# Patient Record
Sex: FEMALE | Race: WHITE
[De-identification: names, ages, dates, MRNs, and addresses within clinical notes are randomized per-mention and may not be internally consistent; named-entity substitution may affect disease eponyms.]

---

## 2019-12-07 ENCOUNTER — HOSPITAL ENCOUNTER (EMERGENCY)
Dept: HOSPITAL 43 - DL.ED | Age: 34
Discharge: HOME | End: 2019-12-07
Payer: COMMERCIAL

## 2019-12-07 DIAGNOSIS — M54.42: Primary | ICD-10-CM

## 2019-12-07 PROCEDURE — 99283 EMERGENCY DEPT VISIT LOW MDM: CPT

## 2019-12-07 PROCEDURE — 96372 THER/PROPH/DIAG INJ SC/IM: CPT

## 2019-12-07 NOTE — EDM.PDOC
ED HPI GENERAL MEDICAL PROBLEM





- General


Chief Complaint: Back Pain or Injury


Stated Complaint: LOWER BACK PAIN


Time Seen by Provider: 19 04:10


Source of Information: Reports: Patient


History Limitations: Reports: No Limitations





- History of Present Illness


INITIAL COMMENTS - FREE TEXT/NARRATIVE: 





ED ambulatory with c/o low back pain radiating down left leg x 2 weeks, worse 

past 2 days. Onset of pain with manipulation at chiropractor. Seen in clinic 

last on Thursday. Given tramadol and flexeril with medrol dose pack, PT to 

start on Monday. Unable to find position of comfort. Increased pain shooting 

down leg with some movements. Alternating heat and ice. No weakness, no saddle 

anesthesia or incontinence. 


  ** Lower Back


Pain Score (Numeric/FACES): 8





- Related Data


 Allergies











Allergy/AdvReac Type Severity Reaction Status Date / Time


 


No Known Allergies Allergy   Verified 19 04:30











Home Meds: 


 Home Meds





. [No Known Home Meds]  18 [History]











Past Medical History





- Past Surgical History


GI Surgical History: Reports: Cholecystectomy


Female  Surgical History: Reports:  Section


Other Musculoskeletal Surgeries/Procedures:: bilateral knee surgery





Social & Family History





- Family History


Family Medical History: Noncontributory





- Tobacco Use


Smoking Status *Q: Never Smoker


Second Hand Smoke Exposure: No





- Caffeine Use


Caffeine Use: Reports: Soda





- Recreational Drug Use


Recreational Drug Use: No





ED ROS GENERAL





- Review of Systems


Review Of Systems: Comprehensive ROS is negative, except as noted in HPI.





ED EXAM,LOWER BACK PAIN/INJURY





- Physical Exam


Exam: See Below


Exam Limited By: No Limitations


General Appearance: Alert, Moderate Distress


Eye Exam: Bilateral Eye: EOMI


Ears: Normal External Exam


Throat/Mouth: Normal Inspection


Head: Atraumatic, Normocephalic


Neck: Normal Inspection, Full Range of Motion


Respiratory/Chest: Normal Breath Sounds


Cardiovascular: Normal Peripheral Pulses


GI/Abdominal: Soft


Back Exam: Decreased Range of Motion, Muscle Spasm, Paraspinal Tenderness (left)


Extremities: Normal Inspection


Neurological: Alert, Normal Mood/Affect, Oriented x 3, Straight Leg Raise (L).  

No: Saddle Anesthesia


Psychiatric: Normal Affect, Normal Mood


Skin Exam: Warm, Dry, Normal Color





Course





- Vital Signs


Last Recorded V/S: 


 Last Vital Signs











Temp  96.5 F   19 05:03


 


Pulse  90   19 05:03


 


Resp  19   19 05:03


 


BP  128/96 H  19 05:03


 


Pulse Ox  100   19 05:03














- Orders/Labs/Meds


Meds: 


Medications














Discontinued Medications














Generic Name Dose Route Start Last Admin





  Trade Name Dea  PRN Reason Stop Dose Admin


 


Hydrocodone Bitart/Acetaminophen  1 tab  19 04:28  19 04:38





  [place] 325-10 Mg  PO  19 04:29  1 tab





  ONETIME ONE   Administration





     





     





     





     


 


Ketorolac Tromethamine  30 mg  19 04:28  19 04:39





  Toradol  IM  19 04:29  30 mg





  ONETIME ONE   Administration





     





     





     





     


 


Orphenadrine Citrate  60 mg  19 04:28  19 04:40





  Norflex  IM  19 04:29  60 mg





  ONETIME ONE   Administration





     





     





     





     














- Re-Assessments/Exams


Free Text/Narrative Re-Assessment/Exam: 





Reports some improvement in pain.





Departure





- Departure


Time of Disposition: 04:44


Disposition: Home, Self-Care 01


Condition: Good


Clinical Impression: 


Low back pain


Qualifiers:


 Chronicity: acute Back pain laterality: left Sciatica presence: with sciatica 

Sciatica laterality: sciatica of left side Qualified Code(s): M54.42 - Lumbago 

with sciatica, left side








- Discharge Information


*PRESCRIPTION DRUG MONITORING PROGRAM REVIEWED*: Yes


*COPY OF PRESCRIPTION DRUG MONITORING REPORT IN PATIENT MUSA: Yes


Instructions:  Sciatica


Referrals: 


PCP,Unobtain [Primary Care Provider] - 


Forms:  ED Department Discharge


Additional Instructions: 


Flexeril 10mg every 8 hours as needed for spasm


hydrocodone 10/325 one every 6 hours as needed for severe pain


tramadol 50mg one every 8 hours as needed for moderate pain


bio freez to area as needed


continue alternating heat and ice


medrol dose pack per previous order from PCP


Clinic follow up Monday am. 


Urgent follow up if symptoms worsen, incontinent of bowel or bladder

## 2022-10-03 ENCOUNTER — HOSPITAL ENCOUNTER (EMERGENCY)
Dept: HOSPITAL 43 - DL.ED | Age: 37
Discharge: LEFT BEFORE DISCHARGE | End: 2022-10-03
Payer: COMMERCIAL

## 2022-10-03 DIAGNOSIS — Z53.21: Primary | ICD-10-CM

## 2023-03-07 ENCOUNTER — HOSPITAL ENCOUNTER (EMERGENCY)
Dept: HOSPITAL 43 - DL.ED | Age: 38
Discharge: HOME | End: 2023-03-07
Payer: COMMERCIAL

## 2023-03-07 DIAGNOSIS — Z79.899: ICD-10-CM

## 2023-03-07 DIAGNOSIS — D25.2: ICD-10-CM

## 2023-03-07 DIAGNOSIS — K59.00: Primary | ICD-10-CM

## 2023-03-07 DIAGNOSIS — E03.9: ICD-10-CM

## 2023-03-07 LAB
ANION GAP SERPL CALC-SCNC: 12.9 MEQ/L (ref 7–13)
CHLORIDE SERPL-SCNC: 101 MMOL/L (ref 98–107)
EGFRCR SERPLBLD CKD-EPI 2021: 94 ML/MIN (ref 60–?)
SODIUM SERPL-SCNC: 138 MMOL/L (ref 136–145)

## 2023-04-03 ENCOUNTER — HOSPITAL ENCOUNTER (EMERGENCY)
Dept: HOSPITAL 43 - DL.ED | Age: 38
Discharge: HOME | End: 2023-04-03
Payer: COMMERCIAL

## 2023-04-03 DIAGNOSIS — Z79.899: ICD-10-CM

## 2023-04-03 DIAGNOSIS — Z86.16: ICD-10-CM

## 2023-04-03 DIAGNOSIS — D25.2: Primary | ICD-10-CM

## 2023-04-03 DIAGNOSIS — E03.9: ICD-10-CM

## 2023-04-03 DIAGNOSIS — Z90.49: ICD-10-CM

## 2023-04-03 LAB
AMPHET UR QL SCN: NEGATIVE
AMPHET UR QL SCN: POSITIVE
AMPHETAMINES UR QL SCN>500 NG/ML: POSITIVE
ANION GAP SERPL CALC-SCNC: 10.2 MEQ/L (ref 7–13)
BARBITURATES UR QL SCN: POSITIVE
CHLORIDE SERPL-SCNC: 105 MMOL/L (ref 98–107)
EGFRCR SERPLBLD CKD-EPI 2021: 92 ML/MIN (ref 60–?)
MDMA UR QL SCN: NEGATIVE
OXYCODONE UR QL SCN: NEGATIVE
PCP UR QL SCN: NEGATIVE
SODIUM SERPL-SCNC: 141 MMOL/L (ref 136–145)
TRICYCLICS UR QL SCN: NEGATIVE

## 2023-04-03 PROCEDURE — 96376 TX/PRO/DX INJ SAME DRUG ADON: CPT

## 2023-04-03 PROCEDURE — 85025 COMPLETE CBC W/AUTO DIFF WBC: CPT

## 2023-04-03 PROCEDURE — 36415 COLL VENOUS BLD VENIPUNCTURE: CPT

## 2023-04-03 PROCEDURE — 81001 URINALYSIS AUTO W/SCOPE: CPT

## 2023-04-03 PROCEDURE — 99284 EMERGENCY DEPT VISIT MOD MDM: CPT

## 2023-04-03 PROCEDURE — 96372 THER/PROPH/DIAG INJ SC/IM: CPT

## 2023-04-03 PROCEDURE — 74177 CT ABD & PELVIS W/CONTRAST: CPT

## 2023-04-03 PROCEDURE — 83605 ASSAY OF LACTIC ACID: CPT

## 2023-04-03 PROCEDURE — 80053 COMPREHEN METABOLIC PANEL: CPT

## 2023-04-03 PROCEDURE — 87040 BLOOD CULTURE FOR BACTERIA: CPT

## 2023-04-03 PROCEDURE — 81025 URINE PREGNANCY TEST: CPT

## 2023-04-03 PROCEDURE — 96375 TX/PRO/DX INJ NEW DRUG ADDON: CPT

## 2023-04-03 PROCEDURE — 96374 THER/PROPH/DIAG INJ IV PUSH: CPT

## 2023-04-03 PROCEDURE — 80305 DRUG TEST PRSMV DIR OPT OBS: CPT

## 2025-07-17 ENCOUNTER — HOSPITAL ENCOUNTER (EMERGENCY)
Dept: HOSPITAL 43 - DL.ED | Age: 40
Discharge: NURSING, REHAB OR LONG-TERM CARE | End: 2025-07-17
Payer: COMMERCIAL

## 2025-07-17 DIAGNOSIS — Z79.890: ICD-10-CM

## 2025-07-17 DIAGNOSIS — E03.9: ICD-10-CM

## 2025-07-17 DIAGNOSIS — K25.1: Primary | ICD-10-CM

## 2025-07-17 DIAGNOSIS — Z79.899: ICD-10-CM

## 2025-07-17 LAB
ALBUMIN SERPL-MCNC: 3.7 G/DL (ref 3.4–5)
ALBUMIN/GLOB SERPL: 1.1 {RATIO}
ALP SERPL-CCNC: 208 U/L (ref 46–116)
ALT SERPL-CCNC: 78 U/L (ref 14–59)
ANION GAP SERPL CALC-SCNC: 13.5 MEQ/L (ref 7–13)
APPEARANCE UR: (no result)
AST SERPL-CCNC: 30 U/L (ref 15–37)
BASOPHILS NFR BLD AUTO: 0.8 % (ref 0–1)
BILIRUB SERPL-MCNC: 0.3 MG/DL (ref 0.2–1)
BILIRUB UR STRIP-MCNC: NEGATIVE MG/DL
BUN SERPL-MCNC: 10 MG/DL (ref 7–18)
BUN/CREAT SERPL: 11.8
CALCIUM SERPL-MCNC: 8.9 MG/DL (ref 8.5–10.1)
CHLORIDE SERPL-SCNC: 102 MMOL/L (ref 98–107)
CO2 SERPL-SCNC: 27 MMOL/L (ref 21–32)
COLOR UR: YELLOW
CREAT CL 24H UR+SERPL-VRATE: 77.49 ML/MIN
CREAT SERPL-MCNC: 0.85 MG/DL (ref 0.55–1.02)
EGFRCR SERPLBLD CKD-EPI 2021: 90 ML/MIN (ref 60–?)
EOSINOPHIL NFR BLD AUTO: 8.1 % (ref 1–3)
GLOBULIN SER-MCNC: 3.5 G/DL
GLUCOSE SERPL-MCNC: 103 MG/DL (ref 70–99)
GLUCOSE UR STRIP-MCNC: NEGATIVE MG/DL
HCT VFR BLD AUTO: 43 % (ref 37–47)
HGB BLD-MCNC: 14.6 G/DL (ref 12–16)
KETONES UR STRIP-MCNC: NEGATIVE MG/DL
LEUKOCYTE ESTERASE UR QL STRIP: NEGATIVE
LIPASE SERPL-CCNC: 24 U/L (ref 16–77)
LYMPHOCYTES NFR BLD AUTO: 35.5 % (ref 20.5–50.1)
MCH RBC QN AUTO: 33.5 PG (ref 27–34)
MCHC RBC AUTO-ENTMCNC: 34 G/DL (ref 33–35)
MCHC RBC AUTO-ENTMCNC: 98.6 FL (ref 80–100)
MONOCYTES NFR BLD AUTO: 4.6 % (ref 2–8)
NEUTROPHILS NFR BLD AUTO: 51 % (ref 42.2–75.2)
NITRITE UR QL: NEGATIVE
PATH REV BLD -IMP: (no result)
PH UR STRIP: 5.5 [PH] (ref 5–9)
PLATELET # BLD AUTO: 335 10^3/UL (ref 150–450)
POTASSIUM SERPL-SCNC: 4.5 MMOL/L (ref 3.5–5.1)
PROT SERPL-MCNC: 7.2 G/DL (ref 6.4–8.2)
PROT UR STRIP-MCNC: NEGATIVE MG/DL
RBC # BLD AUTO: 4.36 10^6/UL (ref 4.2–5.4)
RBC UR QL: NEGATIVE
SODIUM SERPL-SCNC: 138 MMOL/L (ref 136–145)
SP GR UR STRIP: >= 1.03 (ref 1–1.03)
UROBILINOGEN UR STRIP-ACNC: 0.2 MG/DL (ref 0.2–1)
WBC # BLD AUTO: 7.7 10^3/UL (ref 5–10)
WBC NRBC COR # BLD AUTO: (no result) 10*3/UL

## 2025-07-17 PROCEDURE — 85025 COMPLETE CBC W/AUTO DIFF WBC: CPT

## 2025-07-17 PROCEDURE — 36415 COLL VENOUS BLD VENIPUNCTURE: CPT

## 2025-07-17 PROCEDURE — 74177 CT ABD & PELVIS W/CONTRAST: CPT

## 2025-07-17 PROCEDURE — 96367 TX/PROPH/DG ADDL SEQ IV INF: CPT

## 2025-07-17 PROCEDURE — 80053 COMPREHEN METABOLIC PANEL: CPT

## 2025-07-17 PROCEDURE — 83690 ASSAY OF LIPASE: CPT

## 2025-07-17 PROCEDURE — 96365 THER/PROPH/DIAG IV INF INIT: CPT

## 2025-07-17 PROCEDURE — 96375 TX/PRO/DX INJ NEW DRUG ADDON: CPT

## 2025-07-17 PROCEDURE — 99285 EMERGENCY DEPT VISIT HI MDM: CPT

## 2025-07-17 PROCEDURE — 96361 HYDRATE IV INFUSION ADD-ON: CPT

## 2025-07-17 PROCEDURE — 81025 URINE PREGNANCY TEST: CPT

## 2025-07-17 PROCEDURE — 81003 URINALYSIS AUTO W/O SCOPE: CPT

## 2025-07-17 PROCEDURE — 96376 TX/PRO/DX INJ SAME DRUG ADON: CPT

## 2025-07-17 RX ADMIN — KETAMINE HYDROCHLORIDE ONE: 50 INJECTION INTRAMUSCULAR; INTRAVENOUS at 20:43

## 2025-07-17 RX ADMIN — HYDROMORPHONE HYDROCHLORIDE ONE: 1 INJECTION, SOLUTION INTRAMUSCULAR; INTRAVENOUS; SUBCUTANEOUS at 17:09

## 2025-07-17 RX ADMIN — SODIUM CHLORIDE ONE MCG: 9 INJECTION, SOLUTION INTRAVENOUS at 17:26

## 2025-07-17 RX ADMIN — FAMOTIDINE ONE MG: 10 INJECTION INTRAVENOUS at 14:36

## 2025-07-17 RX ADMIN — KETOROLAC TROMETHAMINE ONE MG: 30 INJECTION, SOLUTION INTRAMUSCULAR at 15:23

## 2025-07-17 RX ADMIN — SODIUM CHLORIDE ONE: 9 INJECTION, SOLUTION INTRAVENOUS at 21:40

## 2025-07-17 RX ADMIN — SODIUM CHLORIDE ONE MCG: 9 INJECTION, SOLUTION INTRAVENOUS at 21:39

## 2025-07-17 RX ADMIN — HYDROMORPHONE HYDROCHLORIDE ONE MG: 1 INJECTION, SOLUTION INTRAMUSCULAR; INTRAVENOUS; SUBCUTANEOUS at 15:10

## 2025-07-17 RX ADMIN — KETAMINE HYDROCHLORIDE ONE MG: 50 INJECTION INTRAMUSCULAR; INTRAVENOUS at 20:15

## 2025-07-17 RX ADMIN — SODIUM CHLORIDE, SODIUM LACTATE, POTASSIUM CHLORIDE, AND CALCIUM CHLORIDE ONE MLS/HR: .6; .31; .03; .02 INJECTION, SOLUTION INTRAVENOUS at 18:05

## 2025-07-17 RX ADMIN — SODIUM CHLORIDE ONE MG: 9 INJECTION, SOLUTION INTRAVENOUS at 20:08

## 2025-07-17 RX ADMIN — MORPHINE SULFATE ONE MG: 2 INJECTION, SOLUTION INTRAMUSCULAR; INTRAVENOUS at 18:56

## 2025-07-17 RX ADMIN — HYDROMORPHONE HYDROCHLORIDE ONE MG: 1 INJECTION, SOLUTION INTRAMUSCULAR; INTRAVENOUS; SUBCUTANEOUS at 14:36

## 2025-07-17 RX ADMIN — SODIUM CHLORIDE ONE GM: 9 INJECTION, SOLUTION INTRAVENOUS at 17:20

## 2025-07-17 RX ADMIN — IOPAMIDOL ONE ML: 612 INJECTION, SOLUTION INTRAVENOUS at 16:08

## 2025-07-17 RX ADMIN — METRONIDAZOLE ONE MLS/HR: 500 SOLUTION INTRAVENOUS at 17:19

## 2025-07-17 RX ADMIN — Medication SCH MG: at 20:45
